# Patient Record
Sex: FEMALE | Race: WHITE | NOT HISPANIC OR LATINO | URBAN - METROPOLITAN AREA
[De-identification: names, ages, dates, MRNs, and addresses within clinical notes are randomized per-mention and may not be internally consistent; named-entity substitution may affect disease eponyms.]

---

## 2019-07-12 ENCOUNTER — APPOINTMENT (RX ONLY)
Dept: URBAN - METROPOLITAN AREA CLINIC 23 | Facility: CLINIC | Age: 49
Setting detail: DERMATOLOGY
End: 2019-07-12

## 2019-07-12 DIAGNOSIS — Z71.89 OTHER SPECIFIED COUNSELING: ICD-10-CM

## 2019-07-12 DIAGNOSIS — L81.5 LEUKODERMA, NOT ELSEWHERE CLASSIFIED: ICD-10-CM

## 2019-07-12 PROCEDURE — 99202 OFFICE O/P NEW SF 15 MIN: CPT

## 2019-07-12 PROCEDURE — ? RECOMMENDATIONS

## 2019-07-12 PROCEDURE — ? IN-HOUSE DISPENSING PHARMACY

## 2019-07-12 PROCEDURE — ? COUNSELING

## 2019-07-12 PROCEDURE — ? SUNSCREEN RECOMMENDATIONS

## 2019-07-12 ASSESSMENT — LOCATION ZONE DERM
LOCATION ZONE: ARM
LOCATION ZONE: LEG
LOCATION ZONE: TRUNK

## 2019-07-12 ASSESSMENT — LOCATION SIMPLE DESCRIPTION DERM
LOCATION SIMPLE: RIGHT PRETIBIAL REGION
LOCATION SIMPLE: RIGHT FOREARM
LOCATION SIMPLE: LEFT FOREARM
LOCATION SIMPLE: ABDOMEN
LOCATION SIMPLE: LEFT PRETIBIAL REGION

## 2019-07-12 ASSESSMENT — LOCATION DETAILED DESCRIPTION DERM
LOCATION DETAILED: RIGHT VENTRAL DISTAL FOREARM
LOCATION DETAILED: PERIUMBILICAL SKIN
LOCATION DETAILED: LEFT PROXIMAL PRETIBIAL REGION
LOCATION DETAILED: LEFT DISTAL DORSAL FOREARM
LOCATION DETAILED: RIGHT PROXIMAL PRETIBIAL REGION

## 2019-07-12 NOTE — PROCEDURE: IN-HOUSE DISPENSING PHARMACY
Product 5 Price/Unit (In Dollars): 0
Product 52 Unit Type: mg
Product 2 Unit Type: jar(s)
Product 10 Application Directions: Apply to the entire face in the Am and pm
Product 7 Application Directions: Take two tablets daily x 3 days as indicated
Product 7 Unit Type: tablets
Name Of Product 3: Hydroquinone pads 6%
Product 5 Amount/Unit (Numbers Only): 6
Name Of Product 11: Hydroquinone 6% pads
Product 3 Application Directions: Apply 1 pad to face 2x a day
Product 1 Price/Unit (In Dollars): 1
Product 1 Application Directions: Apply a thin layer to the skin as directed at night
Product 9 Application Directions: Use to wash the face in the Am and pm
Product 6 Application Directions: Apply to the affected areas of the elbow in the Am and pm
Product 1 Unit Type: tube(s)
Product 9 Unit Type: bottle(s)
Name Of Product 7: Prednisone 10 mg
Name Of Product 10: Ultra sheer moisterizer
Product 2 Application Directions: Apply 1 pad to face twice daily as indicated
Product 10 Refills: 2
Name Of Product 5: Valcyclovir 500mg
Render Product Pricing In Note: No
Product 5 Application Directions: Take 2 per day for 3 days
Name Of Product 1: Hydroquinone4%/tretinoin 0.05%/Fluocinolone 0.01%
Product 5 Unit Type: capsules
Name Of Product 9: Gentle green tea cleanser
Name Of Product 6: Glycolic Exfoliating pads
Product 11 Application Directions: Apply to face and shoulder in AM and PM daily.
Name Of Product 4: Tretinoin 0.05%
Product 4 Application Directions: Apply pea sized amount to entire face at bedtime
Detail Level: Zone

## 2019-07-12 NOTE — HPI: SKIN LESION
How Severe Is Your Skin Lesion?: moderate
Has Your Skin Lesion Been Treated?: not been treated
Is This A New Presentation, Or A Follow-Up?: Skin Lesions
Additional History: She previously has history of tinea versicolor.

## 2019-07-12 NOTE — PROCEDURE: MIPS QUALITY
Detail Level: Detailed
Quality 131: Pain Assessment And Follow-Up: Pain assessment using a standardized tool is documented as negative, no follow-up plan required
Quality 130: Documentation Of Current Medications In The Medical Record: Eligible clinician attests to documenting in the medical record the patient is not eligible for a current list of medications being obtained, updated, or reviewed by the eligible clinician
Quality 474: Zoster Vaccination Status: Shingrix vaccine was not administered for reasons documented by clinician (e.g. patient administered vaccine other than Shingrix, patient allergy or other medical reasons, patient declined or other patient reasons, vaccine not available or other system reasons)

## 2019-07-12 NOTE — PROCEDURE: RECOMMENDATIONS
Detail Level: Detailed
Recommendations (Free Text): Picoway face $700 picoway chest $700 Picoway neck -[place]\\nSculptra $900 \\nVbeam $ 200
Recommendation Preamble: The following recommendations were made at todayâs visit :

## 2019-07-15 ENCOUNTER — APPOINTMENT (RX ONLY)
Dept: URBAN - METROPOLITAN AREA CLINIC 23 | Facility: CLINIC | Age: 49
Setting detail: DERMATOLOGY
End: 2019-07-15

## 2019-07-15 DIAGNOSIS — Z41.9 ENCOUNTER FOR PROCEDURE FOR PURPOSES OTHER THAN REMEDYING HEALTH STATE, UNSPECIFIED: ICD-10-CM

## 2019-07-15 PROCEDURE — ? VELASHAPE

## 2019-07-15 PROCEDURE — ? FILLERS

## 2019-07-15 PROCEDURE — ? BOTOX

## 2019-07-15 PROCEDURE — ? ADDITIONAL NOTES

## 2019-07-15 NOTE — PROCEDURE: VELASHAPE
Post-Care Instructions: I reviewed with the patient in detail post-care instructions. Patient should stay away from the sun and wear sun protection until treated areas are fully healed.
Treatment Area: back of thighs
Mode Used: Extreme Reality
Anesthesia Volume In Cc: 0
Treatment Area: abdomen
Treatment Area: arms
Use Distraction Techniques In Note: No
Pre-Procedure Text: The treatment areas were then cleaned and a coupling gel was applied.
Treatment Number (Optional): 1
Pre-Procedures Photographs: Yes
Detail Level: Zone
Post-Procedure: Procedure not performed because velashape machine was not operating properly. Jazmine Certain
Anesthesia Type: 1% lidocaine with epinephrine
Consent: Written consent obtained, risks reviewed including but not limited to crusting, scabbing, blistering, scarring, darker or lighter pigmentary change, and/or incomplete improvement.

## 2019-07-15 NOTE — PROCEDURE: FILLERS
Brows Filler  Volume In Cc: 0
Additional Anesthesia Volume In Cc: 6
Additional Area 1 Location: Left NLFs , lip liner
Additional Area 2 Location: lateral high cheeks, vermillion lips, jawlines, sub mental
Additional Area 2 Volume In Cc: 1
Detail Level: Zone
Consent: Written consent obtained. Risks include but not limited to bruising, beading, irregular texture, ulceration, infection, allergic reaction, scar formation, incomplete augmentation, temporary nature, procedural pain.
Post-Care Instructions: Patient instructed to apply ice to reduce swelling.
Filler: Restylane Defyne
Price (Use Numbers Only, No Special Characters Or $): 0.00
Use Map Statement For Sites (Optional): No
Map Statment: See [address] for Complete Details
Lot #: [id number]
Lot #: [id number]
Expiration Date (Month Year): 12/31/20
Expiration Date (Month Year): 11/27
Include Cannula Information In Note?: Yes
Include Cannula Size?: 25G
Anesthesia Type: 1% lidocaine with epinephrine
Include Cannula Length?: 1.5 inch
Anesthesia Volume In Cc: 0.5
Include Cannula Brand?: DermaSculpt

## 2019-07-15 NOTE — PROCEDURE: BOTOX
Additional Area 2 Units: 0
Expiration Date (Month Year): 11/21
Dilution (U/0.1 Cc): 1.2
Post-Care Instructions: Patient instructed to not lie down for 4 hours, limit physical activity for 24 hours and avoid manipulation of the treated areas.
Additional Area 1 Location: Neck bands ( special)
Additional Area 2 Location: lateral eyes ( special)
Additional Area 3 Location: chin
Detail Level: Detailed
Lot #: [id number]
Consent: Written consent obtained. Risks include but not limited to lid/brow ptosis, bruising, swelling, diplopia, temporary effect, incomplete chemical denervation.
Additional Area 3 Units: 4

## 2019-09-11 ENCOUNTER — APPOINTMENT (RX ONLY)
Dept: URBAN - METROPOLITAN AREA CLINIC 23 | Facility: CLINIC | Age: 49
Setting detail: DERMATOLOGY
End: 2019-09-11

## 2019-09-11 DIAGNOSIS — Z41.9 ENCOUNTER FOR PROCEDURE FOR PURPOSES OTHER THAN REMEDYING HEALTH STATE, UNSPECIFIED: ICD-10-CM

## 2019-09-11 PROCEDURE — ? ADDITIONAL NOTES

## 2019-09-11 PROCEDURE — ? DIAGNOSIS COMMENT

## 2019-09-11 NOTE — PROCEDURE: MIPS QUALITY
Quality 130: Documentation Of Current Medications In The Medical Record: Eligible clinician attests to documenting in the medical record the patient is not eligible for a current list of medications being obtained, updated, or reviewed by the eligible clinician
Quality 131: Pain Assessment And Follow-Up: Pain assessment using a standardized tool is documented as negative, no follow-up plan required
Detail Level: Detailed
Additional Notes: Pain 0/10

## 2019-09-11 NOTE — PROCEDURE: ADDITIONAL NOTES
Detail Level: Simple
Additional Notes: Laser: [facility]\\n\\n\\nLocation: full face\\nWavelength: 1064 resolve \\nSpot Size:6x6mm \\nFluence:1.70 j/cm2\\nPasses: 4\\nHz:5 \\n\\nLocation: full face\\nWavelength:532 resolve \\nSpot Size:6x6mm \\nFluence:0.40j/cm2\\nPasses:2 \\nHz :5 \\n\\nWritten consent was obtained, risks reviewed including but not limited to crusting, scabbing, blistering, scarring, darker or lighter pigmentary change, paradoxical hair regrowth, incomplete removal of hair and infection. Prior to perifollicular erythema and edema. Vaseline and ice applied. Post care reviewed with patient.

## 2020-02-14 ENCOUNTER — APPOINTMENT (RX ONLY)
Dept: URBAN - METROPOLITAN AREA CLINIC 23 | Facility: CLINIC | Age: 50
Setting detail: DERMATOLOGY
End: 2020-02-14

## 2020-02-14 DIAGNOSIS — Z41.9 ENCOUNTER FOR PROCEDURE FOR PURPOSES OTHER THAN REMEDYING HEALTH STATE, UNSPECIFIED: ICD-10-CM

## 2020-02-14 PROCEDURE — ? PICOWAY LASER

## 2020-02-14 NOTE — PROCEDURE: PICOWAY LASER
Fluence (J/Cm2): 1.7
Location Override: spot on the face
Fluence (J/Cm2): 2
Pre-Procedure: Prior to proceeding the treatment areas were cleaned and all present put on their eye protection.
Wavelength: 1064 nm
Spot Size: 6.5 mm
Pulse Duration: 2.5 ms
Post-Procedure Care: Immediate endpoint: mild erythema. Vaseline and ice applied. Post care reviewed with patient.
Treatment Number: 0
Wavelength: 532 nm
Consent: Written consent obtained, risks reviewed including but not limited to crusting, scabbing, blistering, scarring, darker or lighter pigmentary change, paradoxical hair regrowth, incomplete removal of hair and infection.
Fluence (J/Cm2): 0.3
Anesthesia Type: 1% lidocaine with epinephrine
Spot Size: 2.0 mm
Detail Level: Detailed
Post-Care Instructions: I reviewed with the patient in detail post-care instructions. Patient should avoid sun for a minimum of 4 weeks before and after treatment.
Fluence (J/Cm2): 1.3
Hide Pulse Duration?: Yes
Location Override: spots on the face

## 2020-05-15 ENCOUNTER — APPOINTMENT (RX ONLY)
Dept: URBAN - METROPOLITAN AREA CLINIC 23 | Facility: CLINIC | Age: 50
Setting detail: DERMATOLOGY
End: 2020-05-15

## 2020-05-15 DIAGNOSIS — Z41.9 ENCOUNTER FOR PROCEDURE FOR PURPOSES OTHER THAN REMEDYING HEALTH STATE, UNSPECIFIED: ICD-10-CM

## 2020-05-15 PROCEDURE — ? FILLERS

## 2020-05-15 PROCEDURE — ? BOTOX

## 2020-05-15 PROCEDURE — ? PLATELET RICH PLASMA INJECTION

## 2020-05-15 PROCEDURE — ? PICOWAY LASER

## 2020-05-15 NOTE — PROCEDURE: FILLERS
Additional Area 3 Volume In Cc: 0
Include Cannula Size?: 25G
Additional Area 2 Location: oral commissures ,marionettes lines , upper lip lines
Price (Use Numbers Only, No Special Characters Or $): 0.00
Additional Area 4 Location: chin line, jawlines, upper lip lines
Include Cannula Length?: 1.5 inch
Additional Area 1 Location: fine lines , perioral, oral commissures
Use Map Statement For Sites (Optional): No
Additional Area 1 Location: chin
Additional Area 3 Location: perioral fine lines, vermillion lips
Additional Area 5 Location: Vermillion lips, chin marionette lines
Additional Area 4 Location: vermillion lips, marionettes lines
Map Statment: See [address] for Complete Details
Additional Area 5 Volume In Cc: 1
Consent: Written consent obtained. Risks include but not limited to bruising, beading, irregular texture, ulceration, infection, allergic reaction, scar formation, incomplete augmentation, temporary nature, procedural pain.
Lot #: [id number]
Post-Care Instructions: Patient instructed to apply ice to reduce swelling.
Anesthesia Type: 1% lidocaine with epinephrine
Expiration Date (Month Year): 2021-03-31
Anesthesia Volume In Cc: 0.3
Additional Area 5 Location: oral commissures, upper lip lines, vermillion lips
Include Cannula Brand?: DermaSculpt
Additional Area 5 Location: jawlines, lateral cheeks
Lot #: [id number]
Additional Anesthesia Volume In Cc: 6
Expiration Date (Month Year): 06/09/21
Lot #: [id number] [facility]
Lot #: [id number]
Include Cannula Information In Note?: Yes
Additional Area 1 Location: lips, NLfolds ,
Expiration Date (Month Year): 05/19/2020
Expiration Date (Month Year): 11/30/20
Additional Area 2 Location: Remaining syringe will be injected in 7 days post hydase injections to tear through areas.
Additional Area 1 Location: NLFâs, glabella fine lines. Using the cannula to bilateral tear through area.
Detail Level: Detailed
Filler: Restylane Refyne
Additional Area 3 Location: vermillion lips, tear through, lateral cheeks

## 2020-05-15 NOTE — PROCEDURE: BOTOX
Show Lcl Units: No
Additional Area 2 Location: lateral eyes ( special)
Inferior Lateral Orbicularis Oculi Units: 0
Show Additional Area 3: Yes
Detail Level: Detailed
Additional Area 4 Location: chin
Periorbital Skin Units: 24
Expiration Date (Month Year): 09/2022
Forehead Units: 12
Lot #: [id number]
Additional Area 4 Units: 4
Dilution (U/0.1 Cc): 1.2
Consent: Written consent obtained. Risks include but not limited to lid/brow ptosis, bruising, swelling, diplopia, temporary effect, incomplete chemical denervation.
Additional Area 3 Location: lateral brows
Post-Care Instructions: Patient instructed to not lie down for 4 hours, limit physical activity for 24 hours and avoid manipulation of the treated areas.
Glabellar Complex Units: [address] [facility]
Additional Area 1 Location: Neck bands ( special)

## 2020-05-15 NOTE — PROCEDURE: PICOWAY LASER
Wavelength: 1064 nm
Spot Size: 6.0 mm
Treatment Number: 0
Fluence (J/Cm2): 1.3
Fluence (J/Cm2): 0.4
Spot Size: 2.0 mm
Pulse Duration: 2.5 ms
Consent: Written consent obtained, risks reviewed including but not limited to crusting, scabbing, blistering, scarring, darker or lighter pigmentary change, paradoxical hair regrowth, incomplete removal of hair and infection.
Pre-Procedure: Prior to proceeding the treatment areas were cleaned and all present put on their eye protection.
Wavelength: 532 nm
Post-Care Instructions: I reviewed with the patient in detail post-care instructions. Patient should avoid sun for a minimum of 4 weeks before and after treatment.
Post-Procedure Care: Immediate endpoint: mild erythema. Vaseline and ice applied. Post care reviewed with patient.
Fluence (J/Cm2): 2.1
Spot Size: 6.5 mm
Anesthesia Type: 1% lidocaine with epinephrine
Detail Level: Detailed
Fluence (J/Cm2): 2
Hide Pulse Duration?: Yes

## 2020-05-15 NOTE — PROCEDURE: PLATELET RICH PLASMA INJECTION
Depth In Mm (Will Not Render If 0): 0
Detail Level: Zone
Indication Override: tear troughs
Depth In Mm (Will Not Render If 0): 4
Amount Injected At This Location In Cc (Will Not Render If 0): 2
Standard Default Technique For Making Prp: HAIR \\nTotal Number of Tubes drawn: 2\\nTotal PRP cc collected: 8 cc\\n\\nBlood drawn by medical assistant into Selphyl tubes. Tubes were\\nspun by centrifuge for 10 minutes at 3500 RPM. PRP was drawn into separate syringes and affected areas on the scalp were injected by mesoderm needle technique. \\n\\n**Pictures taken today
Which Technique?: 3
Location #2: chin
Treatment Number (Optional): 1
Consent: Written consent obtained, risks reviewed including but not limited to pain, scarring, infection and incomplete improvement. Patient understands the procedure is cosmetic in nature and will require out of pocket payment.
Technique 2: FACIAL INJECTION\\nTotal Number of Tubes drawn: 1\\nTotal PRP cc collected: 4 cc\\n\\nBlood drawn by medical assistant into Selphyl tubes. Tubes were\\nspun by centrifuge for 10 minutes at 3500 RPM. PRP was drawn into separate syringes, transferred to [facility] . \\n\\n**Pictures taken today
Post-Care Instructions: **For Injections: After the procedure, take precautions agains sun exposure. Do not apply sunscreen for 12 hours after the procedure. Do not apply make-up for 12 hours after the procedure. Avoid alcohol based toners for 10-14 days. After 2-3 days patients can return to their regular skin regimen. \\n\\n**For Hair: Patient instructed to avoid washing hair for 48 hours and to avoid vigorous exercise for 48 hours.
Technique 3: For Skin Rejuvenation\\nTotal Number of Tubes drawn: 1\\nTotal PRP cc collected: 3 cc\\n\\nBlood drawn by medical assistant into Selphyl tubes. Tubes were\\nspun by centrifuge for 10 minutes at 3500 RPM. PRP was drawn into separate syringes. \\n\\n**Pictures taken today
Show Additional Techniques: Yes

## 2020-05-22 ENCOUNTER — APPOINTMENT (RX ONLY)
Dept: URBAN - METROPOLITAN AREA CLINIC 23 | Facility: CLINIC | Age: 50
Setting detail: DERMATOLOGY
End: 2020-05-22

## 2020-05-22 DIAGNOSIS — Z41.9 ENCOUNTER FOR PROCEDURE FOR PURPOSES OTHER THAN REMEDYING HEALTH STATE, UNSPECIFIED: ICD-10-CM

## 2020-05-22 PROCEDURE — ? COOLSCULPTING

## 2020-05-22 NOTE — PROCEDURE: COOLSCULPTING
Applicator Size: CoolAdvantage Curve
Time (Minutes - Will Only Render If Nonzero): [address]
Suction Settings: The suction settings were per protocol.
Time (Minutes - Will Only Render If Nonzero): 35
Location 2: upper abdomen (right)
Applicator Size: [address]
Location 3: lower abdomen (right)
Location 6: flank (right)
Consent: Written consent obtained, risks reviewed including, but not limited to, blistering from suction, darker or lighter pigmentary change, bruising, and/or need for multiple treatments.
Applicator Size: CoolAdvantage Petite Core
Intro: Prior to treatment, the area was cleaned with alcohol and marked out with a marking pen. The gel sheet was then applied uniformly. The applicator was applied to the skin with good contact and suction.
Location 4: upper abdomen (left)
Device: Coolsculpting
Location 1: lower abdomen (left)
Applicator Size: CoolAdvantage Petite
Location 5: flank (left)
Detail Level: Zone
Post Treatment: After treatment, the suction was turned off, and the applicator was removed from the skin.

## 2020-07-17 ENCOUNTER — APPOINTMENT (RX ONLY)
Dept: URBAN - METROPOLITAN AREA CLINIC 23 | Facility: CLINIC | Age: 50
Setting detail: DERMATOLOGY
End: 2020-07-17

## 2020-07-17 VITALS — TEMPERATURE: 97.7 F

## 2020-07-17 DIAGNOSIS — Z41.9 ENCOUNTER FOR PROCEDURE FOR PURPOSES OTHER THAN REMEDYING HEALTH STATE, UNSPECIFIED: ICD-10-CM

## 2020-07-17 PROCEDURE — ? NOVATHREADS

## 2020-07-17 PROCEDURE — ? DYSPORT

## 2020-07-17 PROCEDURE — ? KYBELLA INJECTION

## 2020-07-17 ASSESSMENT — LOCATION DETAILED DESCRIPTION DERM
LOCATION DETAILED: LEFT LATERAL BUCCAL CHEEK
LOCATION DETAILED: LEFT SUPERIOR CENTRAL BUCCAL CHEEK
LOCATION DETAILED: LEFT LATERAL EYEBROW
LOCATION DETAILED: LEFT INFERIOR CENTRAL MALAR CHEEK
LOCATION DETAILED: RIGHT INFERIOR CENTRAL MALAR CHEEK
LOCATION DETAILED: RIGHT LATERAL BUCCAL CHEEK
LOCATION DETAILED: RIGHT LATERAL EYEBROW
LOCATION DETAILED: RIGHT SUPERIOR MEDIAL BUCCAL CHEEK

## 2020-07-17 ASSESSMENT — LOCATION SIMPLE DESCRIPTION DERM
LOCATION SIMPLE: RIGHT EYEBROW
LOCATION SIMPLE: RIGHT CHEEK
LOCATION SIMPLE: LEFT EYEBROW
LOCATION SIMPLE: LEFT CHEEK

## 2020-07-17 ASSESSMENT — LOCATION ZONE DERM: LOCATION ZONE: FACE

## 2020-07-17 NOTE — PROCEDURE: DYSPORT
Lcl Root Units: 0
Additional Area 3 Location: [facility] lines
Show Lateral Platysmal Band Units: Yes
Length Of Topical Anesthesia Application (Optional): 15 minutes
Detail Level: Detailed
Show Right And Left Pupillary Line Units: No
Additional Area 5 Location: high forehead
Additional Area 1 Units: 10
Additional Area 2 Location: lateral brows
Consent: Written consent obtained. Risks include but not limited to lid/brow ptosis, bruising, swelling, diplopia, temporary effect, incomplete chemical denervation.
Expiration Date (Month Year): 12/31/2020
Lot #: [id number]
Additional Area 4 Location: glabella (touch up)
Post-Care Instructions: Patient instructed to not lie down for 4 hours, limit physical activity for 24 hours and avoid manipulation of the treated areas.
Topical Anesthesia?: 20% benzocaine, 8% lidocaine, 4% tetracaine
Dilution (U/ 0.1cc): 1.5
Additional Area 6 Location: chin

## 2020-07-17 NOTE — PROCEDURE: KYBELLA INJECTION
Vials Used (Won't Render If 0 - Required If Using Inventory): 2
Consent: Written consent obtained. Risks include but not limited to bruising, beading, irregular texture, ulceration, infection, allergic reaction, scar formation, incomplete augmentation, temporary nature, procedural pain.
Lot #: [id number]
Treatment Area: mid lower abdomen
Detail Level: Detailed
Expiration Date (Month Year): 09/21
Price (Use Numbers Only, No Special Characters Or $): 0.00
Procedure Text: The treatment areas were cleansed. Willistine  was administered using the parameters mentioned above.
Number Of Grid Dots (Won't Render If 0): [address] [facility]
Anesthesia Volume In Cc: 0.5
Kybella Volume In Cc (Won't Render If 0): 2.4
Post-Care Instructions: Patient instructed to apply ice to reduce swelling.
Treatment Number (Won't Render If 0): 1

## 2020-07-17 NOTE — PROCEDURE: NOVATHREADS
Treatment Number (Optional): 1
Number Of Threads: 6
Detail Level: Detailed
Postcare Statement Text: There were no complications and the patient was given postcare instructions and ice packs.
Thread Size (Optional): 18 G x 4â.
Anesthesia Volume In Cc (Optional): 0.6
Consent: The risks and benefits of the procedure were discussed with the patient. Specifically the risks of swelling, bruising, bleeding, discomfort, infection, damage to nerves, numbness, allergic reactions, pigment changes, partial correction, rippling or dimpling, asymmetry, redness, bumps or nodules, visibility of threads, and scarring were reviewed. After this, consent was obtained.
Shayy Type (Optional): shayy 4. x 6
Post-Care Instructions (For The Patient Hand-Out): I reviewed with the patient in detail post-care instructions. Patient should apply ice packs multiple times a day for a couple days. They were instructed to call if they have any problems.
Anesthesia Type: 1% lidocaine with epinephrine
Pre-Procedure Text: The treatment areas were cleaned with alcohol and chlorhexidine. Insertion and exit points were mapped out with the Silhouette ruler and marked with a surgical marker.
Procedure Text: An 18 gauge needle was used to create the insertions points and the NovaThGoodyTags needles with absorbable sutures were inserted subcutaneously in each direction and advanced through to the exit points on either side. The threads were then tightened and cut adjacent to the exit points and allowed to retract into the subcutaneous space.
Shayy Type (Optional): twist
Consent: The risks and benefits of the procedure were discussed with the patient.  Specifically the risks of swelling, bruising, bleeding, discomfort, infection, damage to nerves, numbness, allergic reactions, pigment changes, partial correction, rippling or dimpling, asymmetry, redness, bumps or nodules, visibility of threads, and scarring were reviewed. After this, consent was obtained.
Thread Size (Optional): 6.0/ 29 G x 1 1/2 inches

## 2020-07-21 ENCOUNTER — APPOINTMENT (RX ONLY)
Dept: URBAN - METROPOLITAN AREA CLINIC 23 | Facility: CLINIC | Age: 50
Setting detail: DERMATOLOGY
End: 2020-07-21

## 2020-08-28 ENCOUNTER — APPOINTMENT (RX ONLY)
Dept: URBAN - METROPOLITAN AREA CLINIC 23 | Facility: CLINIC | Age: 50
Setting detail: DERMATOLOGY
End: 2020-08-28

## 2020-08-28 VITALS — TEMPERATURE: 98.2 F

## 2020-08-28 DIAGNOSIS — Z41.9 ENCOUNTER FOR PROCEDURE FOR PURPOSES OTHER THAN REMEDYING HEALTH STATE, UNSPECIFIED: ICD-10-CM

## 2020-08-28 PROCEDURE — ? BOTOX

## 2020-08-28 PROCEDURE — ? IN-HOUSE DISPENSING PHARMACY

## 2020-08-28 PROCEDURE — ? FILLERS

## 2020-08-28 PROCEDURE — ? PULSED-DYE LASER

## 2020-08-28 NOTE — PROCEDURE: BOTOX
Detail Level: Detailed
Additional Area 4 Units: 0
Show Additional Area 6: Yes
Additional Area 2 Location: [facility]
Additional Area 3 Location: lateral brows
Consent: Written consent obtained. Risks include but not limited to lid/brow ptosis, bruising, swelling, diplopia, temporary effect, incomplete chemical denervation.
Additional Area 2 Units: 4
Show Ucl Units: No
Periorbital Skin Units: 24
Post-Care Instructions: Patient instructed to not lie down for 4 hours, limit physical activity for 24 hours and avoid manipulation of the treated areas.
Lot #: [id number]
Dilution (U/0.1 Cc): 1.2
Glabellar Complex Units: [address] [facility]
Additional Area 1 Location: Nasalis
Reconstitution Date (Optional): 04/23
Forehead Units: 20
Expiration Date (Month Year): 10/2021

## 2020-08-28 NOTE — PROCEDURE: IN-HOUSE DISPENSING PHARMACY
Product 19 Unit Type: mg
Product 24 Units Dispensed: 0
Product 11 Price/Unit (In Dollars): 1
Product 1 Unit Type: ml
Product 13 Application Directions: Wash the face in the AM and PM
Product 4 Unit Type: tablets
Name Of Product 2: Sandy Jaime
Product 13 Unit Type: bottle(s)
Name Of Product 5: Prednisone 20mg
Name Of Product 14: Valtrex 500mg
Product 2 Application Directions: Apply at bedtime
Product 9 Application Directions: Apply to affected area face at bedtime only as indicated.
Product 3 Price/Unit (In Dollars): 2
Product 5 Application Directions: Take one tablet by mouth tomorrow as indicated for swelling
Product 9 Unit Type: jar(s)
Product 14 Application Directions: Take one tablet in the Am and Pm x 3 days
Name Of Product 8: Valium 5 mg
Product 14 Amount/Unit (Numbers Only): [address]
Product 3 Application Directions: Take 1 tablet-today and one tablets tomorrow as indicated
Render Refills If Set To 0: No
Product 12 Application Directions: Apply to the entire face in the Am and Pm
Name Of Product 1: Latiesse 3 ml
Product 8 Application Directions: Take one tablet today one hour prior to procedure as indicated.
Product 10 Amount/Unit (Numbers Only): 5
Name Of Product 13: Exfoliate Glycolic Cleanser
Product 1 Application Directions: Apply to eyelids am and pm
Product 6 Amount/Unit (Numbers Only): [address] [facility]
Name Of Product 9: Brightening pads 6%
Product 4 Application Directions: Take 1 tablet today in office post injections  for swelling as indicated.
Product 15 Amount/Unit (Numbers Only): [address] [facility]
Name Of Product 7: Latisse 5ml
Product 11 Application Directions: Apply to the face once at night.
Detail Level: Detailed
Product 7 Application Directions: Apply to each eye lashes at bedtime
Name Of Product 3: Prednisone 10 mg
Name Of Product 12: Skin Better Even tone Serum
Name Of Product 10: Valium
Name Of Product 21: Defenage skincare
Name Of Product 6: Tretinoin 0.05% cream
Name Of Product 15: Viviscal PRO
Product 10 Application Directions: Take two tablets with water prior to procedure.
Product 21 Application Directions: Use as directed
Product 1 Price/Unit (In Dollars): 0.00
Product 21 Unit Type: kit(s)
Product 6 Application Directions: Apply pea size amount to entire face at bedtime only.
Product 15 Application Directions: Take one tablet twice daily for 3-6 months
Name Of Product 11: Hydroquinone 6% pads
Product 1 Amount/Unit (Numbers Only): 3
Product 6 Unit Type: grams

## 2020-08-28 NOTE — PROCEDURE: FILLERS
Tear Troughs Filler  Volume In Cc: 0
Include Cannula Brand?: DermaSculpt
Lot #: [id number]
Anesthesia Volume In Cc: 0.3
Expiration Date (Month Year): 06/09/21
Include Cannula Size?: 25G
Expiration Date (Month Year): 05/19/2020
Include Cannula Information In Note?: Yes
Include Cannula Information In Note?: No
Additional Area 1 Location: lips, chin, cheeks, jawline
Additional Area 1 Location: NLFâs, glabella fine lines. Using the cannula to bilateral tear through area.
Additional Anesthesia Volume In Cc: 6
Additional Area 1 Volume In Cc: 1
Include Cannula Length?: 1.5 inch
Additional Area 2 Location: oral commissures ,marionettes lines , upper lip lines
Additional Area 2 Location: Remaining syringe will be injected in 7 days post hydase injections to tear through areas.
Additional Area 1 Location: fine lines , perioral, oral commissures
Additional Area 3 Location: vermillion lips, tear through, lateral cheeks
Additional Area 3 Location: perioral fine lines, vermillion lips
Additional Area 1 Location: chin
Detail Level: Detailed
Filler: Restylane Refyne
Additional Area 4 Location: chin line, jawlines, upper lip lines
Additional Area 4 Location: vermillion lips, marionettes lines
Consent: Written consent obtained. Risks include but not limited to bruising, beading, irregular texture, ulceration, infection, allergic reaction, scar formation, incomplete augmentation, temporary nature, procedural pain.
Post-Care Instructions: Patient instructed to apply ice to reduce swelling.
Price (Use Numbers Only, No Special Characters Or $): 0.00
Additional Area 5 Location: oral commiseurs, perioral fine lines
Additional Area 5 Location: jawlines, lateral cheeks
Lot #: [id number]
Lot #: [id number]
Map Statment: See [address] for Complete Details
Expiration Date (Month Year): 2021-4-30
Additional Area 5 Location: oral commissures, upper lip lines, vermillion lips
Expiration Date (Month Year): 11/30/20
Anesthesia Type: 1% lidocaine with epinephrine
Lot #: [id number]

## 2020-08-28 NOTE — PROCEDURE: PULSED-DYE LASER
Delay Time (Ms): 10
Pulse Duration: 10 ms
Treated Area: small area
Delay Time (Ms): [address] [facility], [place], operated by [facility]
Cryogen Time (Ms): [address] [address]
Consent: Written consent obtained, risks reviewed including but not limited to crusting, scabbing, blistering, scarring, darker or lighter pigmentary change, incidental hair removal, bruising, and/or incomplete removal.
Post-Care Instructions: I reviewed with the patient in detail post-care instructions. Patient should stay away from the sun and wear sun protection until treated areas are fully healed.
Laser Type: Vbeam 595nm
Pulse Duration: 6 ms
Spot Size: 7 mm
Spot Size: 10 mm
Spot Size: 10x3 mm
Delay Time (Ms): 20
Post-Procedure Care: Vaseline and ice applied. Post care reviewed with patient.
Cryogen Time (Ms): 30
Fluence In J/Cm2 (Optional): 6.00
Detail Level: Detailed
Location Override: full face
Price (Use Numbers Only, No Special Characters Or $): 0.00
Cryogen Time (Ms): 27

## 2020-09-10 ENCOUNTER — APPOINTMENT (RX ONLY)
Dept: URBAN - METROPOLITAN AREA CLINIC 23 | Facility: CLINIC | Age: 50
Setting detail: DERMATOLOGY
End: 2020-09-10

## 2020-09-10 VITALS — TEMPERATURE: 97.5 F

## 2020-09-10 DIAGNOSIS — Z41.9 ENCOUNTER FOR PROCEDURE FOR PURPOSES OTHER THAN REMEDYING HEALTH STATE, UNSPECIFIED: ICD-10-CM

## 2020-09-10 PROCEDURE — ? MICRONEEDLING

## 2020-09-10 PROCEDURE — ? PLATELET RICH PLASMA INJECTION

## 2020-09-10 PROCEDURE — ? CANDELA NORDLYS

## 2020-09-10 NOTE — PROCEDURE: MICRONEEDLING
Infusions (Optional): growth factor
Depth In Mm: 0.1
Depth In Mm: 0.25
Post-Care Instructions: After the procedure, take precautions agains sun exposure. Do not apply sunscreen for 12 hours after the procedure. Do not apply make-up for 12 hours after the procedure. Avoid alcohol based toners for 10-14 days. After 2-3 days patients can return to their regular skin regimen. \\nLot [id number] Exp.6-
Detail Level: Zone
Consent: Written consent obtained, risks reviewed including but not limited to pain, scarring, infection and incomplete improvement. Patient understands the procedure is cosmetic in nature and will require out of pocket payment.
Location #1: chest with PRP
Treatment Number (Optional): 0

## 2020-09-10 NOTE — PROCEDURE: PLATELET RICH PLASMA INJECTION
Depth In Mm (Will Not Render If 0): 0
Consent: Written consent obtained, risks reviewed including but not limited to pain, scarring, infection and incomplete improvement. Patient understands the procedure is cosmetic in nature and will require out of pocket payment.
Depth In Mm (Will Not Render If 0): 4
Detail Level: Zone
Technique 2: FACIAL INJECTION\\nTotal Number of Tubes drawn: 1\\nTotal PRP cc collected: 4 cc\\n\\nBlood drawn by medical assistant into Selphyl tubes. Tubes were\\nspun by centrifuge for 10 minutes at 3500 RPM. PRP was drawn into separate syringes, transferred to [facility]. Affected areas were injected with 30G needle technique.  \\n\\n**Pictures taken today
Treatment Number (Optional): 1
Post-Care Instructions: **For Injections: After the procedure, take precautions agains sun exposure. Do not apply sunscreen for 12 hours after the procedure. Do not apply make-up for 12 hours after the procedure. Avoid alcohol based toners for 10-14 days. After 2-3 days patients can return to their regular skin regimen. \\n\\n**For Hair: Patient instructed to avoid washing hair for 48 hours and to avoid vigorous exercise for 48 hours.
Amount Injected At This Location In Cc (Will Not Render If 0): 3
Show Additional Techniques: Yes
Which Technique?: Default
Location #1: neck area
Standard Default Technique For Making Prp: HAIR \\nTotal Number of Tubes drawn: 2\\nTotal PRP cc collected: 8 cc\\n\\nBlood drawn by medical assistant into Selphyl tubes. Tubes were\\nspun by centrifuge for 10 minutes at 3500 RPM. PRP was drawn into separate syringes and affected areas on the scalp were injected by mesoderm needle technique. \\n\\n**Pictures taken today

## 2020-09-10 NOTE — HPI: COSMETIC PROCEDURE
Additional History: Patient is having PRP Neck area,Micro-needling with PRP and hair laser removal upper lip and chin\\nPatient as Quote $2,200.

## 2020-09-10 NOTE — PROCEDURE: CANDELA NORDLYS
Consent: Written consent obtained, risks reviewed including but not limited to crusting, scabbing, blistering, scarring, darker or lighter pigmentary change, and/or incomplete removal.
Passes (Will Not Render If Zero): 0
Eye Shielding Text (Leave Blank If Unwanted- Will Be Inserted If Selecting Eye Shields): The intraocular eye shields were placed. 2 drops of intraocular tetracaine HCL ophthalmic 0.5% solution was administered. The eye shields were coated with ophthalmic bacitracin prior to insertion. After the shields were removed the eyes were flushed with normal saline.
Applicator:  (645-950nm)
Detail Level: Zone
Post-Care Instructions: I reviewed with the patient in detail post-care instructions.
Fluence (Optional): 15 J/cm2
Fluence In J/Cm2: 100
Modality: SWT
Treatment Number (Will Not Render If Zero): 1
Location: upper cutaneous lip

## 2020-09-25 ENCOUNTER — APPOINTMENT (RX ONLY)
Dept: URBAN - METROPOLITAN AREA CLINIC 23 | Facility: CLINIC | Age: 50
Setting detail: DERMATOLOGY
End: 2020-09-25

## 2020-09-25 VITALS — TEMPERATURE: 98.2 F

## 2020-09-25 DIAGNOSIS — Z41.9 ENCOUNTER FOR PROCEDURE FOR PURPOSES OTHER THAN REMEDYING HEALTH STATE, UNSPECIFIED: ICD-10-CM

## 2020-09-25 PROCEDURE — ? ADDITIONAL NOTES

## 2020-09-25 PROCEDURE — ? PICOWAY LASER

## 2020-09-25 PROCEDURE — ? RECOMMENDATIONS

## 2020-09-25 NOTE — PROCEDURE: PICOWAY LASER
Location Override: face
Treatment Number: 0
Fluence (J/Cm2): 1.3
Handpiece: Resolve
Hide Pulse Duration?: Yes
Treatment Number: 2
Post-Procedure Care: Immediate endpoint: mild erythema. Vaseline and ice applied. Post care reviewed with patient.
Handpiece: n/a nm
Pulse Duration: 2.5 ms
Wavelength: 1064 nm
Anesthesia Type: 1% lidocaine with epinephrine
Wavelength: 532 nm
Detail Level: Detailed
Spot Size: 6.0 mm
Consent: Written consent obtained, risks reviewed including but not limited to crusting, scabbing, blistering, scarring, darker or lighter pigmentary change, paradoxical hair regrowth, incomplete removal of hair and infection.
Spot Size: 6.5 mm
Frequency (Hz): 6Hz
Post-Care Instructions: I reviewed with the patient in detail post-care instructions. Patient should avoid sun for a minimum of 4 weeks before and after treatment.
Spot Size: 2.0 mm
Fluence (J/Cm2): 1.7
Pre-Procedure: Prior to proceeding the treatment areas were cleaned and all present put on their eye protection.
Fluence (J/Cm2): 0.3

## 2020-09-25 NOTE — PROCEDURE: RECOMMENDATIONS
Recommendations (Free Text): IPL Chest for lentigines\\nPRP scalp discussed
Recommendation Preamble: The following recommendations were made at todayâs visit :
Detail Level: Detailed

## 2020-10-22 ENCOUNTER — APPOINTMENT (RX ONLY)
Dept: URBAN - METROPOLITAN AREA CLINIC 23 | Facility: CLINIC | Age: 50
Setting detail: DERMATOLOGY
End: 2020-10-22

## 2020-10-22 VITALS — TEMPERATURE: 96.8 F

## 2020-10-22 DIAGNOSIS — Z41.9 ENCOUNTER FOR PROCEDURE FOR PURPOSES OTHER THAN REMEDYING HEALTH STATE, UNSPECIFIED: ICD-10-CM

## 2020-10-22 PROCEDURE — ? CANDELA NORDLYS

## 2020-10-22 PROCEDURE — ? PLATELET RICH PLASMA INJECTION

## 2020-10-22 NOTE — PROCEDURE: CANDELA NORDLYS
Modality: SWT
Treatment Number (Will Not Render If Zero): 0
Treatment Number (Will Not Render If Zero): 2
Fluence In J/Cm2: 100
Location: chin
Applicator:  (600-950nm)
Detail Level: Zone
Fluence (Optional): 15 J/cm2
Consent: Written consent obtained, risks reviewed including but not limited to crusting, scabbing, blistering, scarring, darker or lighter pigmentary change, and/or incomplete removal.
Post-Care Instructions: I reviewed with the patient in detail post-care instructions.
Eye Shielding Text (Leave Blank If Unwanted- Will Be Inserted If Selecting Eye Shields): The intraocular eye shields were placed. 2 drops of intraocular tetracaine HCL ophthalmic 0.5% solution was administered. The eye shields were coated with ophthalmic bacitracin prior to insertion. After the shields were removed the eyes were flushed with normal saline.

## 2020-10-22 NOTE — PROCEDURE: PLATELET RICH PLASMA INJECTION
Which Technique?: Default
Technique 3: TOPICAL\\nTotal Number of Tubes drawn: 1\\n\\nBlood drawn by medical assistant into Selphyl tubes. Tubes were spun by centrifuge for 10 minutes at 3500 RPM. PRP was drawn into separate syringes and affected areas were applied topically.
Amount Injected At This Location In Cc (Will Not Render If 0): 0
Detail Level: Zone
Indication Override: knees
Technique 2: FACIAL INJECTION\\nTotal Number of Tubes drawn: 1\\n\\n\\nBlood drawn by medical assistant into Selphyl tubes. Tubes were\\nspun by centrifuge for 10 minutes at 3500 RPM. PRP was drawn into separate syringes, transferred to [facility]. Affected areas were injected with 30G needle and/or cannula technique.  \\n\\n**Pictures taken today
Show Additional Techniques: Yes
Amount Injected At This Location In Cc (Will Not Render If 0): 0.4
Location #2: right knee
Treatment Number (Optional): 1
Post-Care Instructions: **For Injections: After the procedure, take precautions agains sun exposure. Do not apply sunscreen for 12 hours after the procedure. Do not apply make-up for 12 hours after the procedure. Avoid alcohol based toners for 10-14 days. After 2-3 days patients can return to their regular skin regimen. \\n\\n**For Hair: Patient instructed to avoid washing hair for 48 hours and to avoid vigorous exercise for 48 hours.
Amount Of Blood Collected (Optional - Include Units): 2
Location #1: left knee
Standard Default Technique For Making Prp: HAIR \\nTotal Number of Tubes drawn: 2\\n\\nBlood drawn by medical assistant into Selphyl tubes. Tubes were\\nspun by centrifuge for 10 minutes at 3500 RPM. PRP was drawn into separate syringes, transferred to [facility] and affected areas on the scalp were injected by mesoderm needle technique. \\n\\n**Pictures taken today
Consent: Written consent obtained, risks reviewed including but not limited to pain, scarring, infection and incomplete improvement. Patient understands the procedure is cosmetic in nature and will require out of pocket payment.

## 2020-11-02 ENCOUNTER — APPOINTMENT (RX ONLY)
Dept: URBAN - METROPOLITAN AREA CLINIC 23 | Facility: CLINIC | Age: 50
Setting detail: DERMATOLOGY
End: 2020-11-02

## 2020-11-02 DIAGNOSIS — Z41.9 ENCOUNTER FOR PROCEDURE FOR PURPOSES OTHER THAN REMEDYING HEALTH STATE, UNSPECIFIED: ICD-10-CM

## 2020-11-02 PROCEDURE — ? VELASHAPE

## 2020-11-02 PROCEDURE — ? IN-HOUSE DISPENSING PHARMACY

## 2020-11-02 PROCEDURE — ? FILLERS

## 2020-11-02 PROCEDURE — ? PICOWAY LASER

## 2020-11-02 PROCEDURE — ? BOTOX

## 2020-11-02 ASSESSMENT — LOCATION DETAILED DESCRIPTION DERM
LOCATION DETAILED: LEFT KNEE
LOCATION DETAILED: RIGHT KNEE

## 2020-11-02 ASSESSMENT — LOCATION SIMPLE DESCRIPTION DERM
LOCATION SIMPLE: LEFT KNEE
LOCATION SIMPLE: RIGHT KNEE

## 2020-11-02 ASSESSMENT — LOCATION ZONE DERM: LOCATION ZONE: LEG

## 2020-11-02 NOTE — PROCEDURE: FILLERS
Anesthesia Type: 1% lidocaine with epinephrine
Mid Face Filler  Volume In Cc: 0
Additional Area 4 Location: vermillion lips, marionettes lines
Include Cannula Information In Note?: Yes
Post-Care Instructions: Patient instructed to apply ice to reduce swelling.
Lot #: [id number]
Consent: Written consent obtained. Risks include but not limited to bruising, beading, irregular texture, ulceration, infection, allergic reaction, scar formation, incomplete augmentation, temporary nature, procedural pain.
Expiration Date (Month Year): 2022-08-28
Vermilion Lips Filler Volume In Cc: 0.4
Additional Area 1 Location: fine lines , perioral, oral commissures
Anesthesia Volume In Cc: 0.3
Lot #: [id number]
Additional Area 5 Location: jawlines, lateral cheeks
Include Cannula Brand?: DermaSculpt
Lot #: [id number]
Expiration Date (Month Year): 05/19/2020
Additional Anesthesia Volume In Cc: 6
Additional Area 1 Location: diluted with 1% Lidocaine  (upper knees)
Expiration Date (Month Year): 2022-06-20
Include Cannula Information In Note?: No
Include Cannula Size?: 25G
Include Cannula Length?: 1.5 inch
Detail Level: Detailed
Additional Area 2 Location: Remaining syringe will be injected in 7 days post hydase injections to tear through areas.
Additional Area 1 Volume In Cc: 1.5
Filler: Radiesse
Additional Area 5 Location: oral commissures, upper lip lines, vermillion lips
Price (Use Numbers Only, No Special Characters Or $): 0.00
Additional Area 1 Location: chin
Additional Area 3 Location: vermillion lips, tear through, lateral cheeks
Additional Area 1 Location: NLFâs, glabella fine lines. Using the cannula to bilateral tear through area.
Lot #: [id number]
Additional Area 4 Location: chin line, jawlines, upper lip lines
Expiration Date (Month Year): 06/09/21
Map Statment: See [address] for Complete Details
Additional Area 2 Location: oral commissures ,marionettes lines , upper lip lines
Filler: Juvederm Volbella XC
Additional Area 3 Location: perioral fine lines, vermillion lips
Additional Area 5 Location: oral commiseurs, perioral fine lines

## 2020-11-02 NOTE — PROCEDURE: IN-HOUSE DISPENSING PHARMACY
Product 15 Price/Unit (In Dollars): 0
Product 8 Unit Type: tablets
Product 54 Unit Type: mg
Product 1 Application Directions: Apply to eyelids am and pm
Product 1 Unit Type: ml
Name Of Product 13: Exfoliate Glycolic Cleanser
Product 6 Amount/Unit (Numbers Only): 60[address] [facility]
Name Of Product 9: Brightening pads 6%
Product 4 Application Directions: Take 1 tablet today in office post injections  for swelling as indicated.
Product 11 Price/Unit (In Dollars): 1
Name Of Product 2: Jessica Carmen
Product 15 Amount/Unit (Numbers Only): [address] [facility]
Product 13 Application Directions: Wash the face in the AM and PM
Product 9 Application Directions: Apply to affected area face at bedtime only as indicated.
Name Of Product 5: Prednisone 20mg
Product 13 Unit Type: bottle(s)
Product 2 Application Directions: Apply at bedtime
Product 9 Unit Type: jar(s)
Render Product Pricing In Note: No
Name Of Product 12: Skin Better Even tone Serum
Product 3 Application Directions: Take 1 tablet-today and one tablets tomorrow as indicated
Name Of Product 8: Valium 5 mg
Product 3 Price/Unit (In Dollars): 2
Name Of Product 1: Latiesse 3 ml
Product 12 Application Directions: Apply to the entire face in the Am and Pm
Product 14 Amount/Unit (Numbers Only): [address]
Product 10 Amount/Unit (Numbers Only): 5
Product 8 Application Directions: Take one tablet today one hour prior to procedure as indicated.
Name Of Product 11: Hydroquinone 6% pads
Product 6 Application Directions: Apply pea size amount to entire face at bedtime only.
Product 1 Amount/Unit (Numbers Only): 3
Product 6 Unit Type: grams
Product 21 Application Directions: Use as directed
Product 15 Application Directions: Take one tablet twice daily for 3-6 months
Name Of Product 7: Latisse 5ml
Product 11 Application Directions: Apply to the face once at night.
Product 21 Unit Type: kit(s)
Detail Level: Detailed
Product 7 Application Directions: Apply to each eye lashes at bedtime
Name Of Product 14: Valtrex 500mg
Product 5 Application Directions: Take one tablet by mouth tomorrow as indicated for swelling
Name Of Product 10: Valium
Name Of Product 3: Prednisone 10 mg
Product 14 Application Directions: Take one tablet in the Am and Pm x 3 days
Name Of Product 6: Tretinoin 0.05% cream
Product 10 Application Directions: Take two tablets with water prior to procedure.
Product 1 Price/Unit (In Dollars): 0.00
Name Of Product 21: Defenage skincare
Name Of Product 15: Viviscal PRO

## 2020-11-02 NOTE — PROCEDURE: PICOWAY LASER
Fluence (J/Cm2): 1.3
Fluence (J/Cm2): 0.3
Pre-Procedure: Prior to proceeding the treatment areas were cleaned and all present put on their eye protection.
Treatment Number: 0
Hide Pulse Duration?: Yes
Fluence (J/Cm2): 2.1
Frequency (Hz): 6Hz
Pulse Duration: 2.5 ms
Treatment Number: 1
Handpiece: n/a nm
Wavelength: 1064 nm
Post-Procedure Care: Immediate endpoint: mild erythema. Vaseline and ice applied. Post care reviewed with patient.
Fluence (J/Cm2): 2
Handpiece: Resolve
Wavelength: 532 nm
Spot Size: 6.0 mm
Detail Level: Detailed
Anesthesia Type: 1% lidocaine with epinephrine
Consent: Written consent obtained, risks reviewed including but not limited to crusting, scabbing, blistering, scarring, darker or lighter pigmentary change, paradoxical hair regrowth, incomplete removal of hair and infection.
Spot Size: 2.0 mm
Post-Care Instructions: I reviewed with the patient in detail post-care instructions. Patient should avoid sun for a minimum of 4 weeks before and after treatment.
Spot Size: 6.5 mm

## 2020-11-02 NOTE — PROCEDURE: VELASHAPE
Post-Procedures Photographs: No
Anesthesia Volume In Cc: 0
Mode Used: Acme Packet
Pre-Procedures Photographs: Yes
Treatment Number (Optional): 2
Consent: Written consent obtained, risks reviewed including but not limited to crusting, scabbing, blistering, scarring, darker or lighter pigmentary change, and/or incomplete improvement.
Post-Care Instructions: I reviewed with the patient in detail post-care instructions. Patient should stay away from the sun and wear sun protection until treated areas are fully healed.
Detail Level: Zone
Treatment Area: back of thighs
Anesthesia Type: 1% lidocaine with epinephrine
Pre-Procedure Text: The treatment areas were then cleaned and a coupling gel was applied.

## 2020-11-02 NOTE — PROCEDURE: BOTOX
Dilution (U/0.1 Cc): 1.2
Show Additional Area 3: Yes
Additional Area 3 Location: glabella
Right Periorbital Units: 0
Show Right And Left Periorbital Units: No
Consent: Written consent obtained. Risks include but not limited to lid/brow ptosis, bruising, swelling, diplopia, temporary effect, incomplete chemical denervation.
Additional Area 3 Units: [address] [facility]
Additional Area 1 Location: forehead
Post-Care Instructions: Patient instructed to not lie down for 4 hours, limit physical activity for 24 hours and avoid manipulation of the treated areas.
Expiration Date (Month Year): 6/2022
Additional Area 1 Units: 12
Detail Level: Detailed
Lot #: [id number]
Additional Area 2 Units: 4
Additional Area 2 Location: bunny lines

## 2020-11-18 ENCOUNTER — APPOINTMENT (RX ONLY)
Dept: URBAN - METROPOLITAN AREA CLINIC 23 | Facility: CLINIC | Age: 50
Setting detail: DERMATOLOGY
End: 2020-11-18

## 2020-11-18 DIAGNOSIS — Z41.9 ENCOUNTER FOR PROCEDURE FOR PURPOSES OTHER THAN REMEDYING HEALTH STATE, UNSPECIFIED: ICD-10-CM

## 2020-11-18 PROCEDURE — ? CANDELA NORDLYS

## 2020-11-18 PROCEDURE — ? VELASHAPE

## 2020-11-18 NOTE — PROCEDURE: CANDELA NORDLYS
Passes (Will Not Render If Zero): 0
Pulse Time (Will Not Render If Zero): 27
Consent: Written consent obtained, risks reviewed including but not limited to crusting, scabbing, blistering, scarring, darker or lighter pigmentary change, and/or incomplete removal.
Applicator:  (600-950nm)
Post-Care Instructions: I reviewed with the patient in detail post-care instructions.
Modality: SWT
Eye Shielding Text (Leave Blank If Unwanted- Will Be Inserted If Selecting Eye Shields): The intraocular eye shields were placed. 2 drops of intraocular tetracaine HCL ophthalmic 0.5% solution was administered. The eye shields were coated with ophthalmic bacitracin prior to insertion. After the shields were removed the eyes were flushed with normal saline.
Detail Level: Zone
Fluence (Optional): 12 J/cm2
Treatment Number (Will Not Render If Zero): 1
Pulse Time In Ms (Will Not Render If Zero): [address] [facility]
Location: chin
Location: axillae
Fluence In J/Cm2: 100
Pulse Time (Will Not Render If Zero): 20
Location: buttocks
Fluence (Optional): 13 J/cm2

## 2021-03-18 ENCOUNTER — APPOINTMENT (RX ONLY)
Dept: URBAN - METROPOLITAN AREA CLINIC 23 | Facility: CLINIC | Age: 51
Setting detail: DERMATOLOGY
End: 2021-03-18

## 2021-03-18 VITALS — TEMPERATURE: 97.7 F

## 2021-03-18 DIAGNOSIS — Z41.9 ENCOUNTER FOR PROCEDURE FOR PURPOSES OTHER THAN REMEDYING HEALTH STATE, UNSPECIFIED: ICD-10-CM

## 2021-03-18 PROCEDURE — ?

## 2021-03-18 PROCEDURE — ? NOVATHREADS

## 2021-03-18 PROCEDURE — ? IN-HOUSE DISPENSING PHARMACY

## 2021-03-18 PROCEDURE — ? FILLERS

## 2021-03-18 NOTE — PROCEDURE: EUROTHREADS
Number Of Threads: 10
Total Anesthesia Volume In Cc (Optional): 0
Pre-Procedure Text: The treatment areas were cleaned with alcohol and chlorhexidine. Insertion and exit points were mapped out with the Silhouette ruler and marked with a surgical marker.
Procedure Text: An 18 gauge needle was used to create the insertions points and the [employer] needles with absorbable sutures were inserted subcutaneously in each direction and advanced through to the exit points on either side. The threads were then tightened and cut adjacent to the exit points and allowed to retract into the subcutaneous space.
Thread Size (Optional): [employer] PDO super tornado 29G 25 mm
Postcare Statement Text: There were no complications and the patient was given postcare instructions and ice packs.
Detail Level: Zone
Anesthesia Type: 1% lidocaine with epinephrine
Anesthesia Method: local infiltration
Treatment Number (Optional): 1
Consent: The risks and benefits of the procedure were discussed with the patient. Specifically the risks of swelling, bruising, bleeding, discomfort, infection, damage to nerves, numbness, allergic reactions, pigment changes, partial correction, rippling or dimpling, asymmetry, redness, bumps or nodules, visibility of threads, and scarring were reviewed. After this, consent was obtained.
Post-Care Instructions (For The Patient Hand-Out): I reviewed with the patient in detail post-care instructions. Patient should apply ice packs multiple times a day for a couple days. They were instructed to call if they have any problems.

## 2021-03-18 NOTE — PROCEDURE: NOVATHREADS
Consent: The risks and benefits of the procedure were discussed with the patient. Specifically the risks of swelling, bruising, bleeding, discomfort, infection, damage to nerves, numbness, allergic reactions, pigment changes, partial correction, rippling or dimpling, asymmetry, redness, bumps or nodules, visibility of threads, and scarring were reviewed. After this, consent was obtained.
Procedure Text: An 16 gauge needle was used to create the insertions points and the Silhouette Instalift needles with absorbable sutures were inserted subcutaneously in each direction and advanced through to the exit points on either side. The threads were then tightened and cut adjacent to the exit points and allowed to retract into the subcutaneous space.
Thread Size (Optional): Shayy 4 18 G x4â total= 6
Anesthesia Method: local infiltration
Post-Care Instructions (For The Patient Hand-Out): I reviewed with the patient in detail post-care instructions. Patient should apply ice packs multiple times a day for a couple days. They were instructed to call if they have any problems.
Detail Level: Zone
Anesthesia Type: 1% lidocaine with epinephrine
Anesthesia Volume In Cc (Optional): 0
Number Of Threads: 1
Pre-Procedure Text: The treatment areas were cleaned with alcohol and chlorhexidine. Insertion and exit points were mapped out with the Silhouette ruler and marked with a surgical marker.
Postcare Statement Text: There were no complications and the patient was given postcare instructions and ice packs.
Shayy Type (Optional): Shayy 4 x6.

## 2021-03-18 NOTE — PROCEDURE: FILLERS
[place] Filler  Volume In Cc: 0
Include Cannula Size?: 25G
Post-Care Instructions: Patient instructed to apply ice to reduce swelling.
Expiration Date (Month Year): 05/19/2020
Include Cannula Information In Note?: No
Additional Area 5 Location: jawlines, lateral cheeks
Include Cannula Brand?: DermaSculpt
Additional Anesthesia Volume In Cc: 6
Include Cannula Length?: 1.5 inch
Additional Area 1 Location: lips, NLfolds ,
Additional Area 1 Location: chin
Detail Level: Detailed
Additional Area 2 Location: Remaining syringe will be injected in 7 days post hydase injections to tear through areas.
Lot #: [id number] Count includes the [facility]
Filler: Restylane Kysse
Price (Use Numbers Only, No Special Characters Or $): 0.00
Additional Area 5 Location: oral commissures, upper lip lines, vermillion lips
Additional Area 3 Location: vermillion lips, lateral mouth
Additional Area 1 Location: NLFâs, glabella fine lines. Using the cannula to bilateral tear through area.
Additional Area 3 Volume In Cc: 1
Expiration Date (Month Year): 11/30/20
Lot #: [id number]
Additional Area 4 Location: chin line, jawlines, upper lip lines
Map Statment: See [address] for Complete Details
Additional Area 2 Location: oral commissures ,marionettes lines , upper lip lines
Additional Area 5 Location: oral commiseurs, perioral fine lines
Expiration Date (Month Year): 06/09/21
Include Cannula Information In Note?: Yes
Additional Area 3 Location: perioral fine lines, vermillion lips
Anesthesia Type: 1% lidocaine with epinephrine
Lot #: [id number]
Additional Area 4 Location: vermillion lips, marionettes lines
Additional Area 1 Location: fine lines , perioral, oral commissures
Lot #: [id number]
Expiration Date (Month Year): 2022-07-31
Consent: Written consent obtained. Risks include but not limited to bruising, beading, irregular texture, ulceration, infection, allergic reaction, scar formation, incomplete augmentation, temporary nature, procedural pain.
Anesthesia Volume In Cc: 0.3

## 2021-04-15 ENCOUNTER — APPOINTMENT (RX ONLY)
Dept: URBAN - METROPOLITAN AREA CLINIC 23 | Facility: CLINIC | Age: 51
Setting detail: DERMATOLOGY
End: 2021-04-15

## 2021-04-15 DIAGNOSIS — Z41.9 ENCOUNTER FOR PROCEDURE FOR PURPOSES OTHER THAN REMEDYING HEALTH STATE, UNSPECIFIED: ICD-10-CM

## 2021-04-15 PROCEDURE — ? VELASHAPE

## 2021-04-15 PROCEDURE — ? CANDELA NORDLYS

## 2021-04-15 NOTE — PROCEDURE: VELASHAPE
Mode Used: StatSheet
Post-Procedures Photographs: No
Treatment Area: thighs
Pre-Procedure Text: The treatment areas were then cleaned and a coupling gel was applied.
Post-Care Instructions: I reviewed with the patient in detail post-care instructions. Patient should stay away from the sun and wear sun protection until treated areas are fully healed.
Pre-Procedures Photographs: Yes
Anesthesia Volume In Cc: 0
Detail Level: Zone
Consent: Written consent obtained, risks reviewed including but not limited to crusting, scabbing, blistering, scarring, darker or lighter pigmentary change, and/or incomplete improvement.
Anesthesia Type: 1% lidocaine with epinephrine

## 2021-04-15 NOTE — PROCEDURE: CANDELA NORDLYS
Pulse Time In Ms (Will Not Render If Zero): [address] [facility]
Modality: SWT
Passes (Will Not Render If Zero): 0
Passes (Will Not Render If Zero): 1
Hsv Prophylaxis: no
Detail Level: Zone
Hsv Prophylaxis Prescription: Valtrex 500mg BID starting the day of procedures and continuing until all sites healed
Fluence In J/Cm2: 100
Location: lateral calves
Location: axillae
Consent: Written consent obtained, risks reviewed including but not limited to crusting, scabbing, blistering, scarring, darker or lighter pigmentary change, and/or incomplete removal.
Post-Care Instructions: I reviewed with the patient in detail post-care instructions.
Location: scalp
Eye Shielding Text (Leave Blank If Unwanted- Will Be Inserted If Selecting Eye Shields): The intraocular eye shields were placed. 2 drops of intraocular tetracaine HCL ophthalmic 0.5% solution was administered. The eye shields were coated with ophthalmic bacitracin prior to insertion. After the shields were removed the eyes were flushed with normal saline.
Fluence In J/Cm2(Optional): 12
Pulse Time (Will Not Render If Zero): 36
Pulse Time (Will Not Render If Zero): [address]
Applicator:  (600-950nm)
Fluence In J/Cm2(Optional): 13.6 j/cm2

## 2021-05-15 ENCOUNTER — APPOINTMENT (RX ONLY)
Dept: URBAN - METROPOLITAN AREA CLINIC 23 | Facility: CLINIC | Age: 51
Setting detail: DERMATOLOGY
End: 2021-05-15

## 2021-05-15 DIAGNOSIS — L92.3 FOREIGN BODY GRANULOMA OF THE SKIN AND SUBCUTANEOUS TISSUE: ICD-10-CM

## 2021-05-15 PROCEDURE — ? ORDER TESTS

## 2021-05-15 PROCEDURE — ? ADDITIONAL NOTES

## 2021-05-15 PROCEDURE — 99214 OFFICE O/P EST MOD 30 MIN: CPT

## 2021-05-15 PROCEDURE — ? PRESCRIPTION

## 2021-05-15 RX ORDER — DOXYCYCLINE HYCLATE 100 MG/1
CAPSULE, GELATIN COATED ORAL
Qty: 28 | Refills: 1 | Status: ERX | COMMUNITY
Start: 2021-05-15

## 2021-05-15 RX ORDER — METHYLPREDNISOLONE 4 MG/1
TABLET ORAL
Qty: 1 | Refills: 0 | Status: CANCELLED
Stop reason: CLARIF

## 2021-05-15 RX ORDER — MUPIROCIN 20 MG/G
OINTMENT TOPICAL
Qty: 1 | Refills: 0 | Status: ERX | COMMUNITY
Start: 2021-05-15

## 2021-05-15 RX ADMIN — DOXYCYCLINE HYCLATE: 100 CAPSULE, GELATIN COATED ORAL at 00:00

## 2021-05-15 RX ADMIN — MUPIROCIN 1: 20 OINTMENT TOPICAL at 00:00

## 2021-05-15 ASSESSMENT — LOCATION ZONE DERM: LOCATION ZONE: FACE

## 2021-05-15 ASSESSMENT — LOCATION SIMPLE DESCRIPTION DERM: LOCATION SIMPLE: LEFT CHEEK

## 2021-05-15 ASSESSMENT — LOCATION DETAILED DESCRIPTION DERM: LOCATION DETAILED: LEFT SUPERIOR CENTRAL MALAR CHEEK

## 2021-05-15 NOTE — PROCEDURE: ORDER TESTS
Lab Facility: [id number]
Performing Laboratory: [id number]
Bill For Surgical Tray: no
Expected Date Of Service: 05/17/2021
Billing Type: [employer]
Billing Type: Third-Party Bill
Lab Facility: [id number]
Lab Facility: [id number]

## 2021-05-15 NOTE — PROCEDURE: MIPS QUALITY
Detail Level: Detailed
Additional Notes: Pt denies taking medication
Quality 130: Documentation Of Current Medications In The Medical Record: Documentation of a medical reason(s) for not documenting, updating, or reviewing the patientâs current medications list (e.g., patient is in an urgent or emergent medical situation)

## 2021-05-15 NOTE — PROCEDURE: ADDITIONAL NOTES
Additional Notes: Patient seen on 5-:  After evaluation of patient, left cheek thread insertion site was anesthetized with 1% lidocaine with epi, and 11 blade and forceps used to extract approximately 5 cm of clear suture material. Bacterial culture swab taken of 1) cheek (\"left cheek superior\") 2) thread (\"left cheek inferior\") and thread material to be sent for AFB and fungal.
Detail Level: Simple
Render Risk Assessment In Note?: no

## 2021-05-15 NOTE — HPI: OTHER
Condition:: Cosmetic
Please Describe Your Condition:: Patient complaining of 4 days of pain on the left cheek near site of insertion of Novathreads PDO 2 months ago. States that she has been traveling in [facility] and developed respiratory symptoms (cough, fatigue, malaise, fever) and took Amoxicillin. States that left cheek thread insertion point had been irritating to her (\"was sticking out\") and she was playing with it. Starting Wednesday May 12 she began having more pain and redness at this site, and developed worsening of pain and swelling in her left cheek and inside her mouth. She denies any recurrent fevers.

## 2021-05-17 ENCOUNTER — APPOINTMENT (RX ONLY)
Dept: URBAN - METROPOLITAN AREA CLINIC 23 | Facility: CLINIC | Age: 51
Setting detail: DERMATOLOGY
End: 2021-05-17

## 2021-05-17 DIAGNOSIS — Z02.9 ENCOUNTER FOR ADMINISTRATIVE EXAMINATIONS, UNSPECIFIED: ICD-10-CM

## 2021-05-17 PROCEDURE — ? NO SHOW PLAN

## 2021-06-10 ENCOUNTER — RX ONLY (OUTPATIENT)
Age: 51
Setting detail: RX ONLY
End: 2021-06-10

## 2021-06-10 ENCOUNTER — APPOINTMENT (RX ONLY)
Dept: URBAN - METROPOLITAN AREA CLINIC 23 | Facility: CLINIC | Age: 51
Setting detail: DERMATOLOGY
End: 2021-06-10

## 2021-06-10 DIAGNOSIS — Z41.9 ENCOUNTER FOR PROCEDURE FOR PURPOSES OTHER THAN REMEDYING HEALTH STATE, UNSPECIFIED: ICD-10-CM

## 2021-06-10 DIAGNOSIS — L92.3 FOREIGN BODY GRANULOMA OF THE SKIN AND SUBCUTANEOUS TISSUE: ICD-10-CM

## 2021-06-10 PROCEDURE — ? TREATMENT REGIMEN

## 2021-06-10 PROCEDURE — ? INTRALESIONAL KENALOG

## 2021-06-10 PROCEDURE — ? BOTOX

## 2021-06-10 PROCEDURE — 11900 INJECT SKIN LESIONS </W 7: CPT

## 2021-06-10 PROCEDURE — ? SYNERON'S SUBLIME

## 2021-06-10 RX ORDER — DOXYCYCLINE HYCLATE 100 MG/1
CAPSULE, GELATIN COATED ORAL
Qty: 28 | Refills: 1 | Status: ERX | COMMUNITY
Start: 2021-06-10

## 2021-06-10 ASSESSMENT — LOCATION ZONE DERM
LOCATION ZONE: MUCOUS_MEMBRANE
LOCATION ZONE: FACE

## 2021-06-10 ASSESSMENT — LOCATION SIMPLE DESCRIPTION DERM
LOCATION SIMPLE: LEFT BUCCAL MUCOSA
LOCATION SIMPLE: LEFT CHEEK

## 2021-06-10 ASSESSMENT — LOCATION DETAILED DESCRIPTION DERM
LOCATION DETAILED: LEFT BUCCAL MUCOSA
LOCATION DETAILED: LEFT INFERIOR CENTRAL MALAR CHEEK

## 2021-06-10 NOTE — PROCEDURE: TREATMENT REGIMEN
Detail Level: Detailed
Initiate Treatment: ILK today to inflamed tract along the left oral buccal mucosa \\nDoxycycline 100mg bid x 14 days\\nconsider oral Prednisone- pt declines \\nSublime today q2-3 weeks

## 2021-06-10 NOTE — PROCEDURE: SYNERON'S SUBLIME
Detail Level: Zone
Rf Fluence In J/Cm3 (Optional): [address]
Pulsing (Optional): Auto 1 Hz
Sensitivity Setting (Optional): Normal
Number Of Pulses (Optional): 4 passes
Consent: Written consent obtained, risks reviewed including but not limited to crusting, scabbing, blistering, scarring, darker or lighter pigmentary change, and/or incomplete removal.
Post-Care Instructions: I reviewed with the patient in detail post-care instructions.

## 2021-06-10 NOTE — PROCEDURE: INTRALESIONAL KENALOG
Validate Note Data When Using Inventory: Yes
X Size Of Lesion In Cm (Optional): 0
Medical Necessity Clause: This procedure was medically necessary because the lesions that were treated were:
Lot # (Optional): [id number]
Detail Level: Detailed
Include Z78.9 (Other Specified Conditions Influencing Health Status) As An Associated Diagnosis?: No
Ndc# For Kenalog Only: [id number]
Consent: The risks of atrophy were reviewed with the patient.
Kenalog Preparation: Kenalog
Concentration Of Solution Injected (Mg/Ml): 3.0
Expiration Date (Optional): 9-3776
Total Volume Injected (Ccs- Only Use Numbers And Decimals): 1
Administered By (Optional): Dr Imelda Tate

## 2021-06-10 NOTE — PROCEDURE: BOTOX
Show Lcl Units: No
Show Lateral Platysmal Band Units: Yes
Glabellar Complex Units: [address] [facility]
Additional Area 1 Units: 4
Mentalis Units: 0
Expiration Date (Month Year): 2023-09
Additional Area 2 Location: lateral eyes ( special)
Detail Level: Detailed
Periorbital Skin Units: [address] [facility]
Lot #: [id number]
Consent: Written consent obtained. Risks include but not limited to lid/brow ptosis, bruising, swelling, diplopia, temporary effect, incomplete chemical denervation.
Forehead Units: 14
Dilution (U/0.1 Cc): 1.2
Additional Area 3 Location: lateral brows
Post-Care Instructions: Patient instructed to not lie down for 4 hours, limit physical activity for 24 hours and avoid manipulation of the treated areas.
Additional Area 1 Location: bunny lines

## 2021-08-04 ENCOUNTER — APPOINTMENT (RX ONLY)
Dept: URBAN - METROPOLITAN AREA CLINIC 23 | Facility: CLINIC | Age: 51
Setting detail: DERMATOLOGY
End: 2021-08-04

## 2021-08-04 DIAGNOSIS — Z41.9 ENCOUNTER FOR PROCEDURE FOR PURPOSES OTHER THAN REMEDYING HEALTH STATE, UNSPECIFIED: ICD-10-CM

## 2021-08-04 DIAGNOSIS — L92.3 FOREIGN BODY GRANULOMA OF THE SKIN AND SUBCUTANEOUS TISSUE: ICD-10-CM

## 2021-08-04 PROCEDURE — ? BOTOX

## 2021-08-04 PROCEDURE — 11900 INJECT SKIN LESIONS </W 7: CPT | Mod: NC

## 2021-08-04 PROCEDURE — ? INTRALESIONAL KENALOG

## 2021-08-04 ASSESSMENT — LOCATION SIMPLE DESCRIPTION DERM: LOCATION SIMPLE: LEFT CHEEK

## 2021-08-04 ASSESSMENT — LOCATION DETAILED DESCRIPTION DERM: LOCATION DETAILED: LEFT INFERIOR CENTRAL MALAR CHEEK

## 2021-08-04 ASSESSMENT — LOCATION ZONE DERM: LOCATION ZONE: FACE

## 2021-08-04 NOTE — PROCEDURE: INTRALESIONAL KENALOG
Consent: The risks of atrophy were reviewed with the patient.
Administered By (Optional): Dr. Prasanth Lam
Total Volume Injected (Ccs- Only Use Numbers And Decimals): .4
Concentration Of Solution Injected (Mg/Ml): 3.0
Validate Note Data When Using Inventory: Yes
Include Z78.9 (Other Specified Conditions Influencing Health Status) As An Associated Diagnosis?: No
Ndc# For Kenalog Only: 0998-0423-16
Lot # (Optional): [id number]
Kenalog Preparation: Kenalog
Medical Necessity Clause: This procedure was medically necessary because the lesions that were treated were:
Treatment Number (Optional): 1
Detail Level: Detailed
Expiration Date (Optional): SEP 2022
X Size Of Lesion In Cm (Optional): 0

## 2021-08-04 NOTE — PROCEDURE: BOTOX
Show Lcl Units: No
Show Lateral Platysmal Band Units: Yes
Glabellar Complex Units: [address] [facility]
Additional Area 1 Units: 4
Mentalis Units: 0
Expiration Date (Month Year): 2023-09
Additional Area 2 Location: lateral eyes ( special)
Detail Level: Detailed
Periorbital Skin Units: [address] [facility]
Lot #: [id number]
Consent: Written consent obtained. Risks include but not limited to lid/brow ptosis, bruising, swelling, diplopia, temporary effect, incomplete chemical denervation.
Forehead Units: 14
Dilution (U/0.1 Cc): 1.2
Additional Area 3 Location: lateral brows
Post-Care Instructions: Patient instructed to not lie down for 4 hours, limit physical activity for 24 hours and avoid manipulation of the treated areas.
Additional Area 1 Location: bunny lines

## 2021-08-16 ENCOUNTER — APPOINTMENT (RX ONLY)
Dept: URBAN - METROPOLITAN AREA CLINIC 23 | Facility: CLINIC | Age: 51
Setting detail: DERMATOLOGY
End: 2021-08-16

## 2021-08-16 DIAGNOSIS — Z41.9 ENCOUNTER FOR PROCEDURE FOR PURPOSES OTHER THAN REMEDYING HEALTH STATE, UNSPECIFIED: ICD-10-CM

## 2021-08-16 PROCEDURE — ? CANDELA NORDLYS

## 2021-08-16 NOTE — PROCEDURE: CANDELA NORDLYS
Treatment Number (Will Not Render If Zero): 0
Consent: Written consent obtained, risks reviewed including but not limited to crusting, scabbing, blistering, scarring, darker or lighter pigmentary change, and/or incomplete removal.
Post-Care Instructions: I reviewed with the patient in detail post-care instructions.
Location: upper cutaneous lip
Eye Shielding Text (Leave Blank If Unwanted- Will Be Inserted If Selecting Eye Shields): The intraocular eye shields were placed. 2 drops of intraocular tetracaine HCL ophthalmic 0.5% solution was administered. The eye shields were coated with ophthalmic bacitracin prior to insertion. After the shields were removed the eyes were flushed with normal saline.
Fluence In J/Cm2: 100
Fluence In J/Cm2(Optional): 13 jcm2
Fluence In J/Cm2(Optional): 12.4 j/cm2
Pulse Time In Ms (Will Not Render If Zero): [address] [facility]
Pulse Time (Will Not Render If Zero): 20
Passes (Will Not Render If Zero): 1
Hsv Prophylaxis: no
Fluence In J/Cm2(Optional): 13 j/cm2
Hsv Prophylaxis Prescription: Valtrex 500mg BID starting the day of procedures and continuing until all sites healed
Detail Level: Zone
Modality: SWT
Location: axillae
Location: buttocks

## 2021-08-31 ENCOUNTER — APPOINTMENT (RX ONLY)
Dept: URBAN - METROPOLITAN AREA CLINIC 23 | Facility: CLINIC | Age: 51
Setting detail: DERMATOLOGY
End: 2021-08-31

## 2021-08-31 DIAGNOSIS — L92.3 FOREIGN BODY GRANULOMA OF THE SKIN AND SUBCUTANEOUS TISSUE: ICD-10-CM

## 2021-08-31 PROCEDURE — 99212 OFFICE O/P EST SF 10 MIN: CPT | Mod: NC

## 2021-08-31 PROCEDURE — ? COUNSELING

## 2021-08-31 PROCEDURE — ? ORDER TESTS

## 2021-08-31 PROCEDURE — ? ADDITIONAL NOTES

## 2021-08-31 PROCEDURE — ? DIAGNOSIS COMMENT

## 2021-08-31 ASSESSMENT — LOCATION ZONE DERM: LOCATION ZONE: FACE

## 2021-08-31 ASSESSMENT — LOCATION DETAILED DESCRIPTION DERM: LOCATION DETAILED: LEFT SUPERIOR CENTRAL BUCCAL CHEEK

## 2021-08-31 ASSESSMENT — LOCATION SIMPLE DESCRIPTION DERM: LOCATION SIMPLE: LEFT CHEEK

## 2021-08-31 NOTE — PROCEDURE: ADDITIONAL NOTES
Detail Level: Detailed
Render Risk Assessment In Note?: no
Additional Notes: Lidocaine with epinephrine was injected + 18 gauge was used. Mupirocin applied.

## 2021-09-03 ENCOUNTER — APPOINTMENT (RX ONLY)
Dept: URBAN - METROPOLITAN AREA CLINIC 23 | Facility: CLINIC | Age: 51
Setting detail: DERMATOLOGY
End: 2021-09-03

## 2021-09-03 DIAGNOSIS — Z41.9 ENCOUNTER FOR PROCEDURE FOR PURPOSES OTHER THAN REMEDYING HEALTH STATE, UNSPECIFIED: ICD-10-CM

## 2021-09-03 PROCEDURE — ? ADDITIONAL NOTES

## 2021-09-03 PROCEDURE — ? BOTOX

## 2021-09-03 PROCEDURE — ? IN-HOUSE DISPENSING PHARMACY

## 2021-09-03 NOTE — PROCEDURE: IN-HOUSE DISPENSING PHARMACY
Product 15 Amount/Unit (Numbers Only): [address] [facility]
Product 39 Refills: 0
Name Of Product 10: Flip Jay
Product 2 Price/Unit (In Dollars): 1
Product 7 Unit Type: bottle(s)
Name Of Product 5: Valium 10mg
Name Of Product 17: Aida Posey
Product 14 Application Directions: Take one tablet prior to procedure. Continue twice daily x 3 days as indicated.
Product 58 Unit Type: mg
Render Refills If Set To 0: No
Name Of Product 8: Valium 5 mg
Name Of Product 1: Tretinoin . 05%
Product 6 Amount/Unit (Numbers Only): [address] [facility] [place]
Product 2 Refills: 2
Product 2 Unit Type: tablets
Name Of Product 21: Defenage skincare
Product 12 Application Directions: Apply to the entire face in the Am and Pm
Product 5 Application Directions: Take one tablet by mouth before  procedure
Product 10 Application Directions: Apply to face small amount at bedtime only as indicated
Name Of Product 3: Prednisone 20
Product 17 Application Directions: Apply thin layer to face at bedtime only.
Name Of Product 20: Apply pea size amount to entire face at bedtime only at bedtime only.
Detail Level: Detailed
Product 13 Unit Type: unit(s)
Product 4 Application Directions: Take 1 tablet today in office post injections  for swelling as indicated and one tablet tomorrow as needed for swelling.
Name Of Product 14: Valtrex 500mg
Product 9 Application Directions: Apply to affected area face am as indicated.
Product 11 Unit Type: jar(s)
Product 7 Application Directions: Apply to each eye lashes at bedtime
Product 13 Price/Unit (In Dollars): $10.00
Name Of Product 12: Skin Better Even tone Serum
Product 19 Application Directions: Tretinoin 0.05%
Product 13 Application Directions: Apply to wound site every 48 hours as indicated.
Product 6 Application Directions: Apply pea size amount to entire face at bedtime only.
Name Of Product 4: Prednisone 20mg
Product 6 Unit Type: grams
Product 14 Amount/Unit (Numbers Only): [address]
Product 1 Price/Unit (In Dollars): 0.00
Product 19 Unit Type: tube(s)
Name Of Product 9: Brightening pads 8%
Product 2 Application Directions: Take one tablet Thursday and Friday
Product 11 Application Directions: Apply to the face once at night.
Name Of Product 7: Latisse 3 ml
Product 8 Application Directions: Take 1 tablet prior to procedure. Dispense in office.
Name Of Product 15: Viviscal PRO
Product 21 Application Directions: Use as directed
Name Of Product 6: Tretinoin 0.05% cream
Name Of Product 13: Duoderm thin
Product 3 Units Dispensed: 3
Name Of Product 11: Hydroquinone 8% pads
Product 21 Unit Type: kit(s)
Product 15 Application Directions: Take one tablet twice daily for 3-6 months
Name Of Product 2: prednisone 10
Product 3 Application Directions: Take 1 tablet For swelling as indicated today x 3 days

## 2021-09-03 NOTE — PROCEDURE: BOTOX
Show Glabellar Units: Yes
Mentalis Units: 0
Post-Care Instructions: Patient instructed to not lie down for 4 hours and limit physical activity for 24 hours. Patient instructed not to travel by airplane for 48 hours.
Dilution (U/0.1 Cc): 2.5
Additional Area 3 Location: lateral eyes
Show Right And Left Brow Units: No
Additional Area 6 Location: axilla
Detail Level: Detailed
Forehead Units: 6
Consent: Written consent obtained. Risks include but not limited to lid/brow ptosis, bruising, swelling, diplopia, temporary effect, incomplete chemical denervation.
Additional Area 5 Location: lip flip
Additional Area 4 Location: lateral brows
Additional Area 1 Location: glabella
Expiration Date (Month Year): 2023-11
Lot #: [id number]

## 2021-09-03 NOTE — PROCEDURE: ADDITIONAL NOTES
Render Risk Assessment In Note?: no
Additional Notes: Foreign body on the left cheek looks good, waiting for bacterial culture. given patient doxycycline 100mg and prednisone 20mg x 3 days
Detail Level: Simple

## 2022-03-30 ENCOUNTER — RX ONLY (OUTPATIENT)
Age: 52
Setting detail: RX ONLY
End: 2022-03-30

## 2022-03-30 RX ORDER — FLUCONAZOLE 150 MG/1
TABLET ORAL
Qty: 3 | Refills: 0 | Status: ERX | COMMUNITY
Start: 2022-03-30

## 2022-05-03 ENCOUNTER — RX ONLY (OUTPATIENT)
Age: 52
Setting detail: RX ONLY
End: 2022-05-03

## 2022-05-03 RX ORDER — MUPIROCIN 20 MG/G
OINTMENT TOPICAL
Qty: 22 | Refills: 0 | Status: ERX

## 2022-05-05 ENCOUNTER — APPOINTMENT (RX ONLY)
Dept: URBAN - METROPOLITAN AREA CLINIC 23 | Facility: CLINIC | Age: 52
Setting detail: DERMATOLOGY
End: 2022-05-05

## 2022-05-05 DIAGNOSIS — Z02.9 ENCOUNTER FOR ADMINISTRATIVE EXAMINATIONS, UNSPECIFIED: ICD-10-CM

## 2022-05-05 PROCEDURE — ? NO SHOW PLAN

## 2022-05-13 ENCOUNTER — APPOINTMENT (RX ONLY)
Dept: URBAN - METROPOLITAN AREA CLINIC 23 | Facility: CLINIC | Age: 52
Setting detail: DERMATOLOGY
End: 2022-05-13

## 2022-05-13 DIAGNOSIS — L71.0 PERIORAL DERMATITIS: ICD-10-CM

## 2022-05-13 PROCEDURE — 99213 OFFICE O/P EST LOW 20 MIN: CPT

## 2022-05-13 PROCEDURE — ? TREATMENT REGIMEN

## 2022-05-13 PROCEDURE — ? COUNSELING

## 2022-05-13 PROCEDURE — ? PRESCRIPTION

## 2022-05-13 RX ORDER — DOXYCYCLINE HYCLATE 100 MG/1
CAPSULE, GELATIN COATED ORAL
Qty: 14 | Refills: 1 | Status: ERX | COMMUNITY
Start: 2022-05-13

## 2022-05-13 RX ORDER — PIMECROLIMUS 10 MG/G
CREAM TOPICAL
Qty: 30 | Refills: 1 | Status: ERX | COMMUNITY
Start: 2022-05-13

## 2022-05-13 RX ORDER — SODIUM SULFACETAMIDE AND SULFUR 80; 40 MG/ML; MG/ML
LOTION TOPICAL
Qty: 473 | Refills: 1 | Status: ERX | COMMUNITY
Start: 2022-05-13

## 2022-05-13 RX ADMIN — PIMECROLIMUS: 10 CREAM TOPICAL at 00:00

## 2022-05-13 RX ADMIN — SODIUM SULFACETAMIDE AND SULFUR: 80; 40 LOTION TOPICAL at 00:00

## 2022-05-13 RX ADMIN — DOXYCYCLINE HYCLATE: 100 CAPSULE, GELATIN COATED ORAL at 00:00

## 2022-05-13 ASSESSMENT — LOCATION SIMPLE DESCRIPTION DERM: LOCATION SIMPLE: LEFT LIP

## 2022-05-13 ASSESSMENT — LOCATION DETAILED DESCRIPTION DERM: LOCATION DETAILED: LEFT UPPER CUTANEOUS LIP

## 2022-05-13 ASSESSMENT — LOCATION ZONE DERM: LOCATION ZONE: LIP

## 2022-05-13 NOTE — HPI: RASH
Is This A New Presentation, Or A Follow-Up?: Rash
Additional History: Pt states she very itchy after using the mupirocin.

## 2022-05-13 NOTE — PROCEDURE: TREATMENT REGIMEN
Medicated for c/o pain with  Morphine 1 mg IV, see MAR.
Initiate Treatment: Sulfa wash\\nDoxycycline twice daily x 1 week\\nPimecrolimus cream twice daily
Detail Level: Simple

## 2023-05-31 ENCOUNTER — APPOINTMENT (RX ONLY)
Dept: URBAN - METROPOLITAN AREA CLINIC 23 | Facility: CLINIC | Age: 53
Setting detail: DERMATOLOGY
End: 2023-05-31

## 2023-05-31 DIAGNOSIS — Z41.9 ENCOUNTER FOR PROCEDURE FOR PURPOSES OTHER THAN REMEDYING HEALTH STATE, UNSPECIFIED: ICD-10-CM

## 2023-05-31 PROCEDURE — ? IN-HOUSE DISPENSING PHARMACY

## 2023-05-31 NOTE — PROCEDURE: IN-HOUSE DISPENSING PHARMACY
Product 3 Application Directions: Apply to face in pm as indicated
Product 62 Units Dispensed: 0
Product 14 Application Directions: Take one tablet prior to procedure. Continue twice daily x 3 days as indicated.
Product 56 Unit Type: mg
Product 10 Application Directions: Take  1 tablet now.
Product 1 Application Directions: Apply to the one spot on the cheek twice a day 3-5 days
Product 13 Price/Unit (In Dollars): $10.00
Product 9 Cutler/Unit (In Dollars): 1
Product 20 Application Directions: Apply thin layer at bedtime to entire face
Product 10 Unit Type: tablets
Product 6 Application Directions: Take 1 pill by mouth today
Product 3 Unit Type: jar(s)
Product 1 Unit Type: grams
Product 23 Application Directions: Take by mouth.
Name Of Product 11: Prednisone 10mg
Product 20 Unit Type: bottle(s)
Name Of Product 18: Tretinoin cream 0.05%
Name Of Product 15: Tegan Alicea
Name Of Product 2: Tretinoin 0.05%
Product 16 Amount/Unit (Numbers Only): 5
Product 15 Application Directions: Apply to affected skin as indicated
Name Of Product 4: Latisse 5 ml
Product 5 Application Directions: Apply a pea size amount to Entire face at bedtime
Product 26 Application Directions: Take one pill by mouth
Product 22 Application Directions: Apply small amount to temple and forehead twice daily as indicated x 3 or 5 days
Product 16 Unit Type: ml
Product 2 Unit Type: tube(s)
Name Of Product 31: Prednisone 20 mg
Product 18 Refills: 2
Render Refills If Set To 0: No
Product 13 Unit Type: unit(s)
Product 8 Application Directions: Take 1 tablet before the procedure by mouth
Product 4 Price/Unit (In Dollars): 0.00
Name Of Product 10: Prednisone 10mg tablet
Name Of Product 3: Hydroquinone brightening pads 6%
Name Of Product 14: Valtrex 500mg
Name Of Product 1: 5-FU
Product 24 Price/Unit (In Dollars): [address] [facility]
Name Of Product 20: Leonel Estevez
Name Of Product 6: Tylenol 500mg
Product 18 Amount/Unit (Numbers Only): 20
Name Of Product 23: Diazepam (Valium)
Product 17 Application Directions: Take one tablet 20 mg today in office and 10 mg tomorrow if needed for swelling. Dispense in office.
Name Of Product 19: Flip Jay
Name Of Product 13: Duoderm thin
Product 7 Unit Type: kit(s)
Name Of Product 12: Skin Better Even tone Serum
Detail Level: Detailed
Product 14 Amount/Unit (Numbers Only): [address]
Name Of Product 5: Amber York
Name Of Product 8: Valium 5 mg
Name Of Product 22: Calcipotriene/ 5 FLuoracil cream
Product 16 Application Directions: Apply to lashes at bedtime only
Product 2 Application Directions: Apply pea-size amount to entire face at bedtime only.
Name Of Product 25: Valium 5mg
Product 9 Application Directions: Apply to affected area on the face area am as indicated.
Product 13 Application Directions: Apply to wound site every 48 hours as indicated.
Product 19 Application Directions: Apply thin layer at bedtime to melasma.
Name Of Product 7: Francisca Reyes
Product 15 Amount/Unit (Numbers Only): 27
Product 12 Application Directions: Apply to the entire face in the Am and Pm
Name Of Product 24: Tretinoin 0.05% cream
Product 5 Amount/Unit (Numbers Only): [address] [facility]
Product 11 Application Directions: Take one today in office and one tablet tomorrow for swelling
Product 18 Application Directions: Mix with cerave cream and apply to body daily after shower
Product 4 Application Directions: Apply to Eyelids at bedtime
Product 7 Application Directions: Apply one drop in each eye
Product 21 Application Directions: Apply pea size amount to entire face at bedtime only
Product 24 Application Directions: Apply pea size amount to entire face at bedtime only.
Name Of Product 9: Brightening pads10%
Name Of Product 16: Latisse
